# Patient Record
Sex: MALE | Race: WHITE | NOT HISPANIC OR LATINO
[De-identification: names, ages, dates, MRNs, and addresses within clinical notes are randomized per-mention and may not be internally consistent; named-entity substitution may affect disease eponyms.]

---

## 2021-03-01 PROBLEM — Z00.00 ENCOUNTER FOR PREVENTIVE HEALTH EXAMINATION: Status: ACTIVE | Noted: 2021-03-01

## 2021-03-08 ENCOUNTER — APPOINTMENT (OUTPATIENT)
Dept: SURGERY | Facility: CLINIC | Age: 36
End: 2021-03-08
Payer: MEDICAID

## 2021-03-08 VITALS
DIASTOLIC BLOOD PRESSURE: 76 MMHG | TEMPERATURE: 97.9 F | HEART RATE: 81 BPM | BODY MASS INDEX: 22.35 KG/M2 | SYSTOLIC BLOOD PRESSURE: 118 MMHG | WEIGHT: 165 LBS | HEIGHT: 72 IN

## 2021-03-08 DIAGNOSIS — K64.9 UNSPECIFIED HEMORRHOIDS: ICD-10-CM

## 2021-03-08 DIAGNOSIS — Z80.3 FAMILY HISTORY OF MALIGNANT NEOPLASM OF BREAST: ICD-10-CM

## 2021-03-08 DIAGNOSIS — Z80.8 FAMILY HISTORY OF MALIGNANT NEOPLASM OF OTHER ORGANS OR SYSTEMS: ICD-10-CM

## 2021-03-08 DIAGNOSIS — Z78.9 OTHER SPECIFIED HEALTH STATUS: ICD-10-CM

## 2021-03-08 DIAGNOSIS — F17.200 NICOTINE DEPENDENCE, UNSPECIFIED, UNCOMPLICATED: ICD-10-CM

## 2021-03-08 PROCEDURE — 99072 ADDL SUPL MATRL&STAF TM PHE: CPT

## 2021-03-08 PROCEDURE — 46600 DIAGNOSTIC ANOSCOPY SPX: CPT

## 2021-03-08 PROCEDURE — 99204 OFFICE O/P NEW MOD 45 MIN: CPT | Mod: 25

## 2021-03-11 PROBLEM — Z80.8 FAMILY HISTORY OF MALIGNANT NEOPLASM OF SKIN: Status: ACTIVE | Noted: 2021-03-08

## 2021-03-11 PROBLEM — K64.9 HEMORRHOID: Status: ACTIVE | Noted: 2021-03-08

## 2021-03-11 PROBLEM — Z78.9 CAFFEINE USE: Status: ACTIVE | Noted: 2021-03-08

## 2021-03-11 PROBLEM — F17.200 CURRENT EVERY DAY SMOKER: Status: ACTIVE | Noted: 2021-03-08

## 2021-03-11 PROBLEM — Z80.3 FAMILY HISTORY OF MALIGNANT NEOPLASM OF BREAST: Status: ACTIVE | Noted: 2021-03-08

## 2021-03-11 RX ORDER — HYDROCORTISONE 25 MG/G
2.5 CREAM TOPICAL
Qty: 30 | Refills: 0 | Status: ACTIVE | COMMUNITY
Start: 2021-02-09

## 2021-03-11 NOTE — PHYSICAL EXAM
[No HSM] : no hepatosplenomegaly [Skin Tags] : residual hemorrhoidal skin tags were noted [Normal] : was normal [None] : there was no rectal mass  [Respiratory Effort] : normal respiratory effort [Normal Rate and Rhythm] : normal rate and rhythm [Abdomen Masses] : No abdominal masses [Abdomen Tenderness] : ~T No ~M abdominal tenderness [Excoriation] : no perianal excoriation [Fistula] : no fistulas [Wart] : no warts [Ulcer ___ cm] : no ulcers [Pilonidal Cyst] : no pilonidal cysts [Tender, Swollen] : nontender, non-swollen [Thrombosed] : that was not thrombosed [de-identified] : external examination shows large external hemorrhoidal tissue [de-identified] : awake, alert and in no acute distress

## 2021-03-11 NOTE — ASSESSMENT
[FreeTextEntry1] : 35M with large bleeding grade III hemorrhoids\par \par  I had a discussion with the patient regarding hemorrhoid pathology.  Given the size and symptoms, I recommended examination under anesthesia and excisional hemorrhoidectomy.  All risks benefits and alternatives including bleeding, infection, damage to the sphincter complex, incontinence to gas or stool or recurrence were discussed.  I described a post operative course of pain for approximately 2-4 weeks. Patient was in agreement with the plan.\par \par Additionally I recommended a high fiber diet, fiber supplementation and laxatives as needed.\par

## 2021-03-11 NOTE — HISTORY OF PRESENT ILLNESS
[FreeTextEntry1] : Patient is a 35M with PMH of constipation who presents for evaluation of bleeding hemorrhoids.  Patient states that he has had symptomatic hemorrhoids for many years and constipation.  With each bowel movement he has prolapse of the hemorrhoids which causes bleeding and throbbing dull pain.  They have to be manually reduced. He has a BM every 2-3 days and has to strain to pass them.  Patient denies fevers, chills, nausea, vomiting, abdominal pain, diarrhea or unexpected weight loss.  Patient denies a family history of colon cancer rectal cancer or inflammatory bowel disease.  He states his last colonoscopy was 1-2 years ago for this problem but he cannot remember who did the procedure or where.  He states they only found hemorrhoids.

## 2021-03-11 NOTE — PROCEDURE
[FreeTextEntry1] : ERNESTO and anoscopy show normal sphincter tone, large grade III internal hemorrhoids and no masses.\par

## 2021-03-12 ENCOUNTER — NON-APPOINTMENT (OUTPATIENT)
Age: 36
End: 2021-03-12

## 2021-03-16 ENCOUNTER — APPOINTMENT (OUTPATIENT)
Dept: SURGERY | Facility: CLINIC | Age: 36
End: 2021-03-16
Payer: MEDICAID

## 2021-03-16 VITALS — HEIGHT: 72 IN | WEIGHT: 164 LBS | BODY MASS INDEX: 22.21 KG/M2

## 2021-03-16 DIAGNOSIS — R10.30 LOWER ABDOMINAL PAIN, UNSPECIFIED: ICD-10-CM

## 2021-03-16 DIAGNOSIS — F17.210 NICOTINE DEPENDENCE, CIGARETTES, UNCOMPLICATED: ICD-10-CM

## 2021-03-16 DIAGNOSIS — S76.219A STRAIN OF ADDUCTOR MUSCLE, FASCIA AND TENDON OF UNSPECIFIED THIGH, INITIAL ENCOUNTER: ICD-10-CM

## 2021-03-16 DIAGNOSIS — F41.9 ANXIETY DISORDER, UNSPECIFIED: ICD-10-CM

## 2021-03-16 PROCEDURE — 99214 OFFICE O/P EST MOD 30 MIN: CPT

## 2021-03-16 PROCEDURE — 99072 ADDL SUPL MATRL&STAF TM PHE: CPT

## 2021-03-16 NOTE — CONSULT LETTER
[Dear  ___] : Dear  [unfilled], [Courtesy Letter:] : I had the pleasure of seeing your patient, [unfilled], in my office today. [Please see my note below.] : Please see my note below. [Consult Closing:] : Thank you very much for allowing me to participate in the care of this patient.  If you have any questions, please do not hesitate to contact me. [FreeTextEntry3] : Respectfully,\par \par Kendall Serra M.D., FACS\par

## 2021-03-16 NOTE — PHYSICAL EXAM
[Normal Breath Sounds] : Normal breath sounds [No Rash or Lesion] : No rash or lesion [Alert] : alert [Anxious] : anxious [JVD] : no jugular venous distention  [de-identified] : healthy [de-identified] : normal [de-identified] : soft and flat abdomen\par  [de-identified] : normal testicles [de-identified] : no hernia recurrence

## 2021-03-16 NOTE — ASSESSMENT
[FreeTextEntry1] : Maykel is a pleasant 35-year-old currently unemployed gentleman with a past medical history significant for anxiety, cigarette smoking, irritable bowel syndrome associated with intermittent diarrhea and constipation and a bilateral inguinal hernia repair with mesh approximately 10 years ago who presents back to the office with concerns about bilateral (although worse on the right) groin pain which began approximately one year ago and has worsened over the last several months as he has had 4-5 bouts of pain usually related to physical activity or intimacy.\par \par Physical examination demonstrates well healed scars in both groins with no evidence of hernia recurrence or delayed wound complications on either side. He does have some mild to moderate tenderness to deep palpation which is relatively common and may indicate bilateral chronic groin muscle strain. Both testicles are normal. There is no evidence of hydrocele or varicocele. There is no testicular masses or epididymal cysts. He does not demonstrate a Tinel sign on either side. His umbilical examination is unremarkable. He has lost a significant amount of weight as he used to weigh 280 pounds. His current weight is 164 pounds and his BMI is 22.\par \par Maykel was counseled and reassured. I believe he is experiencing significant chronic groin muscle strain due to overexertion and his chronic bowel issues.   I typically recommend alternating ice/heat therapy and nonsteroidal anti-inflammatory medication when necessary, and avoiding strenuous physical activity whenever possible. However, he has hemorrhoid surgery schedule for next Friday and must defer using anti-inflammatory medications for some time. He is very concerned that his mesh is interfering with his body in ways that may be responsible for all of his symptoms. For that reason,  I ordered a CT scan of the abdomen and pelvis without IV contrast to be done in the near future. I will call the patient with the results once I receive the report and review the images personally.  Further management will be determined at that time.  If no obvious evidence of hernia recurrence is noted and no other significant pathology to explain his symptoms, I recommend aggressive management for chronic groin strain along with a consultation with pain management. He may even benefit from psychological counseling as well. We also discussed the importance of smoking cessation especially in light of his gastrointestinal symptoms and underlying anxiety. Smoking is also intimately associated with hernia development and recurrence.\par

## 2021-03-17 ENCOUNTER — OUTPATIENT (OUTPATIENT)
Dept: OUTPATIENT SERVICES | Facility: HOSPITAL | Age: 36
LOS: 1 days | Discharge: HOME | End: 2021-03-17

## 2021-03-17 VITALS
DIASTOLIC BLOOD PRESSURE: 68 MMHG | RESPIRATION RATE: 16 BRPM | OXYGEN SATURATION: 100 % | HEIGHT: 72 IN | WEIGHT: 167.99 LBS | HEART RATE: 70 BPM | SYSTOLIC BLOOD PRESSURE: 133 MMHG | TEMPERATURE: 98 F

## 2021-03-17 DIAGNOSIS — K64.2 THIRD DEGREE HEMORRHOIDS: ICD-10-CM

## 2021-03-17 DIAGNOSIS — Z01.818 ENCOUNTER FOR OTHER PREPROCEDURAL EXAMINATION: ICD-10-CM

## 2021-03-17 DIAGNOSIS — Z98.890 OTHER SPECIFIED POSTPROCEDURAL STATES: Chronic | ICD-10-CM

## 2021-03-17 NOTE — H&P PST ADULT - HISTORY OF PRESENT ILLNESS
Pt states he has internal hemorrhoids which he has had for many years but recently began to experience pain and discomfort. Pt complains of sharp pain rated 9/10 which is worse when he has a BM. Pt states hemorrhoids also bleeds. Pt now for listed procedure. Denies any chest pain, difficulty breathing, SOB, palpitations, dysuria, URI, or any other infections in the last 2 weeks. Denies any recent travel, contact, or exposure to any persons with known or suspected COVID-19. Pt also denies COVID testing within the last 2 weeks. Denies any suicidal or homicidal ideations. Pt advised to self quarantine until day of procedure. Exercise tolerance of 2-3 flights of stairs without dyspnea. HILARY reviewed with patient. Pt verbalized understanding of all pre-operative instructions.    Anesthesia Alert  NO--Difficult Airway  NO--History of neck surgery or radiation  NO--Limited ROM of neck  NO--History of Malignant hyperthermia  NO--No personal or family history of Pseudocholinesterase deficiency.  NO--Prior Anesthesia Complication  NO--Latex Allergy  NO--Loose teeth  NO--History of Rheumatoid Arthritis  NO--HILARY  NO--Other_____

## 2021-03-17 NOTE — H&P PST ADULT - REASON FOR ADMISSION
34 yo male presents for PAST in preparation for excisional hemorrhoidectomy with exparel on 3/26/2021 under [facility] by Dr. Paul ([facility])

## 2021-03-17 NOTE — H&P PST ADULT - NSICDXNOPASTMEDICALHX_GEN_ALL_CORE
If you are a smoker, it is important for your health to stop smoking. Please be aware that second hand smoke is also harmful.
<-- Click to add NO pertinent Past Medical History

## 2021-03-18 ENCOUNTER — NON-APPOINTMENT (OUTPATIENT)
Age: 36
End: 2021-03-18

## 2021-03-23 ENCOUNTER — OUTPATIENT (OUTPATIENT)
Dept: OUTPATIENT SERVICES | Facility: HOSPITAL | Age: 36
LOS: 1 days | Discharge: HOME | End: 2021-03-23

## 2021-03-23 ENCOUNTER — LABORATORY RESULT (OUTPATIENT)
Age: 36
End: 2021-03-23

## 2021-03-23 DIAGNOSIS — Z11.59 ENCOUNTER FOR SCREENING FOR OTHER VIRAL DISEASES: ICD-10-CM

## 2021-03-23 DIAGNOSIS — Z98.890 OTHER SPECIFIED POSTPROCEDURAL STATES: Chronic | ICD-10-CM

## 2021-03-23 PROBLEM — Z78.9 OTHER SPECIFIED HEALTH STATUS: Chronic | Status: ACTIVE | Noted: 2021-03-17

## 2021-03-25 ENCOUNTER — NON-APPOINTMENT (OUTPATIENT)
Age: 36
End: 2021-03-25

## 2021-03-26 ENCOUNTER — OUTPATIENT (OUTPATIENT)
Dept: OUTPATIENT SERVICES | Facility: HOSPITAL | Age: 36
LOS: 1 days | Discharge: HOME | End: 2021-03-26
Payer: MEDICAID

## 2021-03-26 ENCOUNTER — APPOINTMENT (OUTPATIENT)
Dept: SURGERY | Facility: AMBULATORY SURGERY CENTER | Age: 36
End: 2021-03-26

## 2021-03-26 ENCOUNTER — RESULT REVIEW (OUTPATIENT)
Age: 36
End: 2021-03-26

## 2021-03-26 VITALS
SYSTOLIC BLOOD PRESSURE: 118 MMHG | RESPIRATION RATE: 20 BRPM | HEART RATE: 67 BPM | TEMPERATURE: 99 F | HEIGHT: 72 IN | OXYGEN SATURATION: 100 % | DIASTOLIC BLOOD PRESSURE: 57 MMHG | WEIGHT: 167.99 LBS

## 2021-03-26 VITALS
OXYGEN SATURATION: 100 % | RESPIRATION RATE: 18 BRPM | DIASTOLIC BLOOD PRESSURE: 60 MMHG | HEART RATE: 65 BPM | SYSTOLIC BLOOD PRESSURE: 125 MMHG

## 2021-03-26 DIAGNOSIS — Z98.890 OTHER SPECIFIED POSTPROCEDURAL STATES: Chronic | ICD-10-CM

## 2021-03-26 PROCEDURE — 88304 TISSUE EXAM BY PATHOLOGIST: CPT | Mod: 26

## 2021-03-26 PROCEDURE — 46260 REMOVE IN/EX HEM GROUPS 2+: CPT

## 2021-03-26 RX ORDER — MORPHINE SULFATE 50 MG/1
2 CAPSULE, EXTENDED RELEASE ORAL
Refills: 0 | Status: DISCONTINUED | OUTPATIENT
Start: 2021-03-26 | End: 2021-03-26

## 2021-03-26 RX ORDER — HYDROMORPHONE HYDROCHLORIDE 2 MG/ML
0.5 INJECTION INTRAMUSCULAR; INTRAVENOUS; SUBCUTANEOUS
Refills: 0 | Status: DISCONTINUED | OUTPATIENT
Start: 2021-03-26 | End: 2021-03-26

## 2021-03-26 RX ORDER — EMTRICITABINE AND TENOFOVIR DISOPROXIL FUMARATE 200; 300 MG/1; MG/1
1 TABLET, FILM COATED ORAL
Qty: 0 | Refills: 0 | DISCHARGE

## 2021-03-26 RX ORDER — ONDANSETRON 8 MG/1
4 TABLET, FILM COATED ORAL ONCE
Refills: 0 | Status: DISCONTINUED | OUTPATIENT
Start: 2021-03-26 | End: 2021-04-09

## 2021-03-26 RX ORDER — ACETAMINOPHEN 500 MG
650 TABLET ORAL ONCE
Refills: 0 | Status: DISCONTINUED | OUTPATIENT
Start: 2021-03-26 | End: 2021-04-09

## 2021-03-26 RX ORDER — BUPIVACAINE 13.3 MG/ML
20 INJECTION, SUSPENSION, LIPOSOMAL INFILTRATION ONCE
Refills: 0 | Status: DISCONTINUED | OUTPATIENT
Start: 2021-03-26 | End: 2021-04-09

## 2021-03-26 RX ORDER — OXYCODONE HYDROCHLORIDE 5 MG/1
1 TABLET ORAL
Qty: 20 | Refills: 0
Start: 2021-03-26

## 2021-03-26 RX ORDER — SODIUM CHLORIDE 9 MG/ML
1000 INJECTION, SOLUTION INTRAVENOUS
Refills: 0 | Status: DISCONTINUED | OUTPATIENT
Start: 2021-03-26 | End: 2021-04-09

## 2021-03-26 RX ADMIN — SODIUM CHLORIDE 100 MILLILITER(S): 9 INJECTION, SOLUTION INTRAVENOUS at 11:36

## 2021-03-26 RX ADMIN — HYDROMORPHONE HYDROCHLORIDE 0.5 MILLIGRAM(S): 2 INJECTION INTRAMUSCULAR; INTRAVENOUS; SUBCUTANEOUS at 11:51

## 2021-03-26 RX ADMIN — MORPHINE SULFATE 2 MILLIGRAM(S): 50 CAPSULE, EXTENDED RELEASE ORAL at 12:15

## 2021-03-26 RX ADMIN — HYDROMORPHONE HYDROCHLORIDE 0.5 MILLIGRAM(S): 2 INJECTION INTRAMUSCULAR; INTRAVENOUS; SUBCUTANEOUS at 11:41

## 2021-03-26 RX ADMIN — HYDROMORPHONE HYDROCHLORIDE 0.5 MILLIGRAM(S): 2 INJECTION INTRAMUSCULAR; INTRAVENOUS; SUBCUTANEOUS at 12:01

## 2021-03-26 NOTE — ASU DISCHARGE PLAN (ADULT/PEDIATRIC) - CARE PROVIDER_API CALL
Manish Paul)  ColonRectal Surgery; Surgery  [address], [address] [place] [address]  Phone: [phone]  Fax: [phone]  Follow Up Time: 2 weeks

## 2021-03-26 NOTE — ASU DISCHARGE PLAN (ADULT/PEDIATRIC) - CALL YOUR DOCTOR IF YOU HAVE ANY OF THE FOLLOWING:
Bleeding that does not stop/Wound/Surgical Site with redness, or foul smelling discharge or pus/Nausea and vomiting that does not stop

## 2021-03-26 NOTE — ASU DISCHARGE PLAN (ADULT/PEDIATRIC) - ASU DC SPECIAL INSTRUCTIONSFT
You are being discharged from [facility]. Please call to schedule a follow up appointment with Dr. Paul as previously discussed in 2 weeks. Please take Tylenol and Ibuprofen every 6 hours staggered for pain. For severe pain, you have been prescribed Oxycodone 5mg please take as needed eveyr 6 hours for severe pain. Do not take more than 4 tablets/day. You have a dressing in place called fibrillar to help stop any bleeding that may happen, this dressing will fall out when you have your first bowel movement. Do not be alarmed if you see this. You may shower, but do not submerge in a water bath. Please avoid heavy weight lifting for the next 4-6 weeks.  If you have any further questions about your care, please do not hesitate to contact Dr. Paul 's office or return to the Emergency Department. You are being discharged from [facility]. Please call to schedule a follow up appointment with Dr. Paul as previously discussed in 2 weeks. Please take Tylenol and Ibuprofen every 6 hours staggered for pain. For severe pain, you have been prescribed Oxycodone 5mg please take as needed eveyr 6 hours for severe pain. Do not take more than 4 tablets/day. You have a dressing in place called fibrillar to help stop any bleeding that may happen, this dressing will fall out when you have your first bowel movement. Do not be alarmed if you see this. Please continue to take your Miralax as previously instructed by Dr. Paul. You may shower, but do not submerge in a water bath. Please avoid heavy weight lifting for the next 4-6 weeks. If you have any further questions about your care, please do not hesitate to contact Dr. Palu 's office or return to the Emergency Department.

## 2021-03-26 NOTE — PRE-ANESTHESIA EVALUATION ADULT - TEMPERATURE IN CELSIUS (DEGREES C)
Detail Level: Detailed Depth Of Biopsy: dermis Was A Bandage Applied: Yes Size Of Lesion In Cm: 0 Biopsy Type: H and E Biopsy Method: Personna blade Anesthesia Type: 1% lidocaine with epinephrine Anesthesia Volume In Cc: 1 Hemostasis: Drysol Wound Care: Vaseline Dressing: pressure dressing Destruction After The Procedure: No Type Of Destruction Used: Curettage Cryotherapy Text: The wound bed was treated with cryotherapy after the biopsy was performed. Electrodesiccation Text: The wound bed was treated with electrodesiccation after the biopsy was performed. Electrodesiccation And Curettage Text: The wound bed was treated with electrodesiccation and curettage after the biopsy was performed. Silver Nitrate Text: The wound bed was treated with silver nitrate after the biopsy Lab: [address] Path Notes (To The Dermatopathologist): EXC + Consent: Verbal consent was obtained and risks were reviewed including, but not limited to, scarring, infection, bleeding, scabbing, and incomplete removal. Post-Care Instructions: Keep area covered and dry for the next 24 hours. Then wash with warm and soapy water and keep area moist with Vasaline/Polysporin Notification Instructions: Patient will be notified of biopsy results within one week Billing Type: Third-Party Bill Information: Selecting Yes will display possible errors in your note based on the variables you have selected. This validation is only offered as a suggestion for you. PLEASE NOTE THAT THE VALIDATION TEXT WILL BE REMOVED WHEN YOU FINALIZE YOUR NOTE. IF YOU WANT TO FAX A PRELIMINARY NOTE YOU WILL NEED TO TOGGLE THIS TO 'NO' IF YOU DO NOT WANT IT IN YOUR FAXED NOTE. 37.1

## 2021-03-26 NOTE — BRIEF OPERATIVE NOTE - NSICDXBRIEFPROCEDURE_GEN_ALL_CORE_FT
PROCEDURES:  External hemorrhoidectomy involving two or more anal columns 26-Mar-2021 11:40:19  Bryan Montoya

## 2021-03-26 NOTE — ASU PREOP CHECKLIST - 1.
patient with bilateral contact lens and body piercing that needs to be remove patient with bilateral contact lens and body piercing that needs to be remove done

## 2021-03-26 NOTE — BRIEF OPERATIVE NOTE - OPERATION/FINDINGS
External hemorrhoidectomy. Left lateral and right posterolateral hemorrhoidal columns identified on visual inspection. Digital exam normal. Hemorrhoidectomy performed, incisions closed and irrigated. Fibrillar left for hemostasis. 30cc Lidocaine injected prior to the surgery and 20cc experel at the end of the surgery.

## 2021-03-29 LAB — SURGICAL PATHOLOGY STUDY: SIGNIFICANT CHANGE UP

## 2021-04-04 DIAGNOSIS — F17.210 NICOTINE DEPENDENCE, CIGARETTES, UNCOMPLICATED: ICD-10-CM

## 2021-04-04 DIAGNOSIS — K64.2 THIRD DEGREE HEMORRHOIDS: ICD-10-CM

## 2021-04-05 DIAGNOSIS — G89.18 OTHER ACUTE POSTPROCEDURAL PAIN: ICD-10-CM

## 2021-04-12 ENCOUNTER — APPOINTMENT (OUTPATIENT)
Dept: SURGERY | Facility: CLINIC | Age: 36
End: 2021-04-12
Payer: MEDICAID

## 2021-04-12 VITALS
TEMPERATURE: 97.9 F | WEIGHT: 165 LBS | BODY MASS INDEX: 22.35 KG/M2 | SYSTOLIC BLOOD PRESSURE: 122 MMHG | HEART RATE: 74 BPM | HEIGHT: 72 IN | DIASTOLIC BLOOD PRESSURE: 64 MMHG

## 2021-04-12 PROCEDURE — 99024 POSTOP FOLLOW-UP VISIT: CPT

## 2021-04-12 NOTE — ASSESSMENT
[FreeTextEntry1] : 35M with large bleeding grade III hemorrhoids s/p excisional hemorrhoidectomy x2\par \par Patient is healing well.  I recommended he continue to keep the area clean and dry.  We will see him back in 1 month.

## 2021-04-12 NOTE — PHYSICAL EXAM
[Abdomen Masses] : No abdominal masses [Abdomen Tenderness] : ~T No ~M abdominal tenderness [No HSM] : no hepatosplenomegaly [Excoriation] : no perianal excoriation [Fistula] : no fistulas [Wart] : no warts [Ulcer ___ cm] : no ulcers [Pilonidal Cyst] : no pilonidal cysts [Tender, Swollen] : nontender, non-swollen [Thrombosed] : that was not thrombosed [Skin Tags] : residual hemorrhoidal skin tags were noted [Normal] : was normal [None] : there was no rectal mass  [Respiratory Effort] : normal respiratory effort [Normal Rate and Rhythm] : normal rate and rhythm [de-identified] : external examination shows healing left lateral and right posterolateral incisions.  No erythema induration or purulence [de-identified] : awake, alert and in no acute distress

## 2021-04-12 NOTE — HISTORY OF PRESENT ILLNESS
[FreeTextEntry1] : Patient is a 35M with PMH of constipation who presents for post op visit s/p excisional hemorrhoidectomy for grade III bleeding hemorrhoids.  He presented with bleeding prolapsing hemorrhoids and constipation.  His constipation improved with conservative management.  On 3/26 he underwent excisional hemorrhoidectomy x2.  Pathology showed hemorrhoids.  He presents today for follow up.  Patient states his pain is much improved and he has minimal bleeding. Patient denies fevers, chills, nausea, vomiting, abdominal pain, diarrhea or unexpected weight loss.  Patient denies a family history of colon cancer rectal cancer or inflammatory bowel disease.  He states his last colonoscopy was 1-2 years ago for this problem but he cannot remember who did the procedure or where.  He states they only found hemorrhoids.

## 2021-05-17 ENCOUNTER — APPOINTMENT (OUTPATIENT)
Dept: SURGERY | Facility: CLINIC | Age: 36
End: 2021-05-17
Payer: MEDICAID

## 2021-05-17 VITALS
BODY MASS INDEX: 22.62 KG/M2 | HEART RATE: 81 BPM | WEIGHT: 167 LBS | HEIGHT: 72 IN | SYSTOLIC BLOOD PRESSURE: 122 MMHG | DIASTOLIC BLOOD PRESSURE: 68 MMHG | TEMPERATURE: 97.7 F

## 2021-05-17 DIAGNOSIS — K64.2 THIRD DEGREE HEMORRHOIDS: ICD-10-CM

## 2021-05-17 PROCEDURE — 99024 POSTOP FOLLOW-UP VISIT: CPT

## 2021-05-17 NOTE — PHYSICAL EXAM
[Abdomen Masses] : No abdominal masses [Abdomen Tenderness] : ~T No ~M abdominal tenderness [No HSM] : no hepatosplenomegaly [Excoriation] : no perianal excoriation [Fistula] : no fistulas [Wart] : no warts [Ulcer ___ cm] : no ulcers [Pilonidal Cyst] : no pilonidal cysts [Tender, Swollen] : nontender, non-swollen [Thrombosed] : that was not thrombosed [Skin Tags] : residual hemorrhoidal skin tags were noted [Normal] : was normal [None] : there was no rectal mass  [Respiratory Effort] : normal respiratory effort [Normal Rate and Rhythm] : normal rate and rhythm [de-identified] : external examination shows healed left lateral and right posterolateral incisions.  No erythema induration or purulence [de-identified] : awake, alert and in no acute distress

## 2021-05-17 NOTE — ASSESSMENT
[FreeTextEntry1] : 35M with large bleeding grade III hemorrhoids s/p excisional hemorrhoidectomy x2\par \par Patient has healed well.  I recommended he continue with a high fiber diet and avoid constipation.  We will see him back as needed.

## 2021-05-17 NOTE — HISTORY OF PRESENT ILLNESS
[FreeTextEntry1] : Patient is a 35M with PMH of constipation who presents for post op visit s/p excisional hemorrhoidectomy for grade III bleeding hemorrhoids.  He presented with bleeding prolapsing hemorrhoids and constipation.  His constipation improved with conservative management.  On 3/26 he underwent excisional hemorrhoidectomy x2.  Pathology showed hemorrhoids.  He presents today for follow up.  Patient states he no longer has pain or bleeding. Patient denies fevers, chills, nausea, vomiting, abdominal pain, diarrhea or unexpected weight loss.  Patient denies a family history of colon cancer rectal cancer or inflammatory bowel disease.  He states his last colonoscopy was 1-2 years ago for this problem but he cannot remember who did the procedure or where.  He states they only found hemorrhoids.

## 2021-08-04 ENCOUNTER — APPOINTMENT (OUTPATIENT)
Dept: SURGERY | Facility: CLINIC | Age: 36
End: 2021-08-04
Payer: MEDICAID

## 2021-08-04 VITALS
SYSTOLIC BLOOD PRESSURE: 120 MMHG | HEART RATE: 65 BPM | DIASTOLIC BLOOD PRESSURE: 70 MMHG | WEIGHT: 161 LBS | BODY MASS INDEX: 21.81 KG/M2 | HEIGHT: 72 IN | TEMPERATURE: 97.3 F

## 2021-08-04 DIAGNOSIS — K59.00 CONSTIPATION, UNSPECIFIED: ICD-10-CM

## 2021-08-04 DIAGNOSIS — K62.5 HEMORRHAGE OF ANUS AND RECTUM: ICD-10-CM

## 2021-08-04 PROCEDURE — 99213 OFFICE O/P EST LOW 20 MIN: CPT

## 2021-08-04 NOTE — HISTORY OF PRESENT ILLNESS
[FreeTextEntry1] : Patient is a 35M with PMH of constipation who presents for follow up.  Patient has a history of excisional hemorrhoidectomy for grade III bleeding hemorrhoids.  Two weeks ago he states he passed a hard stool and then had passed bright red blood. his spontaneously resolved. Patient denies fevers, chills, nausea, vomiting, abdominal pain, diarrhea or unexpected weight loss.  Patient denies a family history of colon cancer rectal cancer or inflammatory bowel disease.  He states his last colonoscopy was 1-2 years ago for this problem but he cannot remember who did the procedure or where.  He states they only found hemorrhoids.

## 2021-08-04 NOTE — PROCEDURE
[FreeTextEntry1] : ERNESTO and anoscopy show normal sphincter tone, normal internal hemorrhoids and no masses.\par

## 2021-08-04 NOTE — PHYSICAL EXAM
[Abdomen Masses] : No abdominal masses [Abdomen Tenderness] : ~T No ~M abdominal tenderness [No HSM] : no hepatosplenomegaly [Excoriation] : no perianal excoriation [Fistula] : no fistulas [Wart] : no warts [Ulcer ___ cm] : no ulcers [Pilonidal Cyst] : no pilonidal cysts [Tender, Swollen] : nontender, non-swollen [Thrombosed] : that was not thrombosed [Skin Tags] : residual hemorrhoidal skin tags were noted [Normal] : was normal [None] : there was no rectal mass  [Respiratory Effort] : normal respiratory effort [Normal Rate and Rhythm] : normal rate and rhythm [de-identified] : external examination shows healed left lateral and right posterolateral incisions.  No erythema induration or purulence [de-identified] : awake, alert and in no acute distress

## 2021-10-06 PROBLEM — K62.5 BLEEDING PER RECTUM: Status: ACTIVE | Noted: 2021-08-04

## 2022-03-16 ENCOUNTER — APPOINTMENT (OUTPATIENT)
Dept: SURGERY | Facility: CLINIC | Age: 37
End: 2022-03-16
Payer: MEDICAID

## 2022-03-16 VITALS
WEIGHT: 163 LBS | DIASTOLIC BLOOD PRESSURE: 70 MMHG | BODY MASS INDEX: 22.08 KG/M2 | HEIGHT: 72 IN | TEMPERATURE: 97.4 F | HEART RATE: 83 BPM | SYSTOLIC BLOOD PRESSURE: 122 MMHG

## 2022-03-16 PROCEDURE — 99213 OFFICE O/P EST LOW 20 MIN: CPT

## 2022-03-16 RX ORDER — EMTRICITABINE AND TENOFOVIR DISOPROXIL FUMARATE 200; 300 MG/1; MG/1
200-300 TABLET, FILM COATED ORAL
Qty: 30 | Refills: 0 | Status: DISCONTINUED | COMMUNITY
Start: 2021-02-22 | End: 2022-03-16

## 2022-03-16 RX ORDER — OXYCODONE 5 MG/1
5 TABLET ORAL TWICE DAILY
Qty: 10 | Refills: 0 | Status: DISCONTINUED | COMMUNITY
Start: 2021-04-05 | End: 2022-03-16

## 2022-03-16 RX ORDER — AMOXICILLIN 500 MG
CAPSULE ORAL
Refills: 0 | Status: DISCONTINUED | COMMUNITY
Start: 2021-03-08 | End: 2022-03-16

## 2022-03-16 RX ORDER — EMTRICITABINE AND TENOFOVIR DISOPROXIL FUMARATE 100; 150 MG/1; MG/1
100-150 TABLET, FILM COATED ORAL DAILY
Refills: 0 | Status: DISCONTINUED | COMMUNITY
Start: 2021-03-08 | End: 2022-03-16

## 2022-03-16 NOTE — PHYSICAL EXAM
[No HSM] : no hepatosplenomegaly [Skin Tags] : residual hemorrhoidal skin tags were noted [Normal] : was normal [None] : there was no rectal mass  [Respiratory Effort] : normal respiratory effort [Normal Rate and Rhythm] : normal rate and rhythm [Abdomen Masses] : No abdominal masses [Abdomen Tenderness] : ~T No ~M abdominal tenderness [Excoriation] : no perianal excoriation [Fistula] : no fistulas [Wart] : no warts [Ulcer ___ cm] : no ulcers [Pilonidal Cyst] : no pilonidal cysts [Tender, Swollen] : nontender, non-swollen [Thrombosed] : that was not thrombosed [de-identified] : awake, alert and in no acute distress [de-identified] : external examination shows healed left lateral and right posterolateral incisions.  No erythema induration or purulence

## 2022-03-16 NOTE — PHYSICAL EXAM
[No HSM] : no hepatosplenomegaly [Skin Tags] : residual hemorrhoidal skin tags were noted [Normal] : was normal [None] : there was no rectal mass  [Respiratory Effort] : normal respiratory effort [Normal Rate and Rhythm] : normal rate and rhythm [Abdomen Masses] : No abdominal masses [Abdomen Tenderness] : ~T No ~M abdominal tenderness [Excoriation] : no perianal excoriation [Fistula] : no fistulas [Wart] : no warts [Ulcer ___ cm] : no ulcers [Pilonidal Cyst] : no pilonidal cysts [Tender, Swollen] : nontender, non-swollen [Thrombosed] : that was not thrombosed [de-identified] : awake, alert and in no acute distress [de-identified] : external examination shows healed left lateral and right posterolateral incisions.  No erythema induration or purulence

## 2022-03-16 NOTE — ASSESSMENT
[FreeTextEntry1] : 36M with history of hemorrhoidectomy presents with bleeding grade II hemorrhoids\par \par I discussed bleeding grade II hemorrhoids with the patient. We will have him back for hemorrhoidal banding.

## 2022-03-16 NOTE — PROCEDURE
[FreeTextEntry1] : ERNESTO and anoscopy show normal sphincter tone, large internal hemorrhoids in the RAL location and no masses.\par

## 2022-03-16 NOTE — HISTORY OF PRESENT ILLNESS
Telephone Encounter by Mae Payne RMA at 08/29/17 07:47 AM     Author:  Mae Payne RMA Service:  (none) Author Type:  Medical Assistant     Filed:  08/29/17 07:47 AM Encounter Date:  8/29/2017 Status:  Signed     :  Mae Payne RMA (Medical Assistant)            Order Providers            Name NPI #     Authorizing Provider Hector Alvarez MD [id number]          Associated Diagnoses         Screen for colon cancer [Z12.11]                Comments         Age: 69 year old   BMI is 27.45 kg/(m^2) calculated from:     Height 5' 6\" (1.676 m) as of 8/28/17     Weight 170 lb (77.111 kg) as of 8/28/17      Does patient have any acute Gi symptoms (blood in stool, rectal bleeding, diarrhea, constipation, severe heartburn, difficulty swallowing, nausea/vomiting, etc.)? No     Does the patient have cardiac issues? No     Current Outpatient Prescriptions:  amlodipine (NORVASC) 5 MG tablet, Take 1 Tab by mouth daily.  lisinopril-hydrochlorothiazide (PRINZIDE,ZESTORETIC) 20-12.5 MG per tablet, Take 1 Tab by mouth daily.  Omega-3 Fatty Acids (FISH OIL) 1000 MG CAPS, Take 1 Cap by mouth 2 (two) times daily.  Red Yeast Rice 600 MG CAPS, Take 1 Cap by mouth 2 (two) times daily.  Calcium 500 MG tablet, Take 1,500 mg by mouth daily.  Cholecalciferol (VITAMIN D) 1000 UNIT Cap, Take 1,000 Units by mouth daily.[LR1.1C]             Revision History        User Key Date/Time User Provider Type Action    > LR1.1 08/29/17 07:47 AM Mae Payne RMA Medical Assistant Sign    C - Copied            
[FreeTextEntry1] : PT is a 35 yo male with a hx of bleeding hemorrhoids, s/p hemorrhoidectomy 3/21. pt reports increased bleeding and tissue protruding 9/10 times he has a BM. Reports 1 Bm every other day.  Pt denies itching, Reports pain with BM.  Pt reports straining to have a BM and tenesmus. PT reports adequate fiber and water intake in his diet, pt is not taking a fiber supplement. Pt denies fever, chills, nausea, vomiting, abdominal pain, constipation, diarrhea, or unexpected weight loss.\par Pt denies family hx of colon or rectal cancer or inflammatory bowel disease. 
[FreeTextEntry1] : PT is a 35 yo male with a hx of bleeding hemorrhoids, s/p hemorrhoidectomy 3/21. pt reports increased bleeding and tissue protruding 9/10 times he has a BM. Reports 1 Bm every other day.  Pt denies itching, Reports pain with BM.  Pt reports straining to have a BM and tenesmus. PT reports adequate fiber and water intake in his diet, pt is not taking a fiber supplement. Pt denies fever, chills, nausea, vomiting, abdominal pain, constipation, diarrhea, or unexpected weight loss.\par Pt denies family hx of colon or rectal cancer or inflammatory bowel disease.

## 2022-03-28 ENCOUNTER — APPOINTMENT (OUTPATIENT)
Dept: SURGERY | Facility: CLINIC | Age: 37
End: 2022-03-28
Payer: MEDICAID

## 2022-03-28 VITALS
SYSTOLIC BLOOD PRESSURE: 119 MMHG | BODY MASS INDEX: 22.08 KG/M2 | DIASTOLIC BLOOD PRESSURE: 77 MMHG | HEART RATE: 73 BPM | TEMPERATURE: 97 F | HEIGHT: 72 IN | OXYGEN SATURATION: 98 % | WEIGHT: 163 LBS

## 2022-03-28 DIAGNOSIS — K64.1 SECOND DEGREE HEMORRHOIDS: ICD-10-CM

## 2022-03-28 PROCEDURE — 46221 LIGATION OF HEMORRHOID(S): CPT

## 2022-03-28 NOTE — HISTORY OF PRESENT ILLNESS
[FreeTextEntry1] : Patient is a 35M with PMH of constipation who presents for follow up.  Patient has a history of excisional hemorrhoidectomy for grade III bleeding hemorrhoids. On his last visit he was found to have large RAL bleeding hemorrhoids.  Today he presents for banding. Patient denies fevers, chills, nausea, vomiting, abdominal pain, diarrhea or unexpected weight loss.  Patient denies a family history of colon cancer rectal cancer or inflammatory bowel disease.  He states his last colonoscopy was 1-2 years ago for this problem but he cannot remember who did the procedure or where.  He states they only found hemorrhoids.

## 2022-03-28 NOTE — ASSESSMENT
[FreeTextEntry1] : 36M with large bleeding grade III hemorrhoids s/p excisional hemorrhoidectomy x2\par \par I had a long conversation with the patient regarding the risks and benefits of banding. He will contact us regarding bleeding or infection.  We will see him back in 1 month.

## 2022-03-28 NOTE — PROCEDURE
[FreeTextEntry1] : ERNESTO and anoscopy show normal sphincter tone, large RAL hemorrhoids and no masses.\par \par After discussion with the patient about hemorrhoidal banding including all risks benefits and alternatives, we proceeded with banding of the right anterolateral hemorrhoids.  Patient was in agreement with the plan and signed surgical consent.\par \par With the patient in the prone jackknife position, the anoscope was inserted to isolate the hemorrhoids. This appeared large and friable with contact bleeding.  Using the disposable suction hemorrhoidal , one band was placed in this location.  This produced some minor bleeding which was self limited.  The patient tolerated the procedure well without pain or dizziness.\par

## 2022-03-28 NOTE — PHYSICAL EXAM
[Abdomen Masses] : No abdominal masses [Abdomen Tenderness] : ~T No ~M abdominal tenderness [No HSM] : no hepatosplenomegaly [Excoriation] : no perianal excoriation [Fistula] : no fistulas [Wart] : no warts [Ulcer ___ cm] : no ulcers [Pilonidal Cyst] : no pilonidal cysts [Tender, Swollen] : nontender, non-swollen [Thrombosed] : that was not thrombosed [Skin Tags] : residual hemorrhoidal skin tags were noted [Normal] : was normal [None] : there was no rectal mass  [Respiratory Effort] : normal respiratory effort [Normal Rate and Rhythm] : normal rate and rhythm [de-identified] : external examination shows healed left lateral and right posterolateral incisions.  No erythema induration or purulence [de-identified] : awake, alert and in no acute distress

## 2022-05-09 ENCOUNTER — APPOINTMENT (OUTPATIENT)
Dept: SURGERY | Facility: CLINIC | Age: 37
End: 2022-05-09

## 2022-06-29 NOTE — ASSESSMENT
[FreeTextEntry1] : 35M s/p excisional hemorrhoidectomy x2  for grade III hemorrhoids now with BRBPR\par \par I discussed hemorrhoidal bleeding with the patient.  We discussed maintaining a high fiber diet,fiber supplement and trying to avoid constipation.  We will see him back in 2-3 months.  If he continues to have bleeding we will recommend hemorrhoidectomy.   Rituxan Counseling:  I discussed with the patient the risks of Rituxan infusions. Side effects can include infusion reactions, severe drug rashes including mucocutaneous reactions, reactivation of latent hepatitis and other infections and rarely progressive multifocal leukoencephalopathy.  All of the patient's questions and concerns were addressed.

## 2025-03-05 ENCOUNTER — APPOINTMENT (OUTPATIENT)
Dept: SURGERY | Facility: CLINIC | Age: 40
End: 2025-03-05
Payer: MEDICAID

## 2025-03-05 VITALS — BODY MASS INDEX: 26.95 KG/M2 | WEIGHT: 199 LBS | HEIGHT: 72 IN

## 2025-03-05 DIAGNOSIS — R10.31 RIGHT LOWER QUADRANT PAIN: ICD-10-CM

## 2025-03-05 DIAGNOSIS — R10.32 RIGHT LOWER QUADRANT PAIN: ICD-10-CM

## 2025-03-05 PROCEDURE — 99204 OFFICE O/P NEW MOD 45 MIN: CPT

## 2025-03-28 ENCOUNTER — APPOINTMENT (OUTPATIENT)
Dept: SURGERY | Facility: CLINIC | Age: 40
End: 2025-03-28

## 2025-03-28 VITALS — WEIGHT: 199 LBS | BODY MASS INDEX: 26.95 KG/M2 | HEIGHT: 72 IN

## 2025-03-28 DIAGNOSIS — R10.32 RIGHT LOWER QUADRANT PAIN: ICD-10-CM

## 2025-03-28 DIAGNOSIS — R10.31 RIGHT LOWER QUADRANT PAIN: ICD-10-CM

## 2025-03-28 PROCEDURE — 99212 OFFICE O/P EST SF 10 MIN: CPT | Mod: 1L,25

## 2025-03-28 PROCEDURE — 64425 NJX AA&/STRD II IH NERVES: CPT | Mod: 50
